# Patient Record
Sex: MALE | Race: WHITE | Employment: OTHER | URBAN - METROPOLITAN AREA
[De-identification: names, ages, dates, MRNs, and addresses within clinical notes are randomized per-mention and may not be internally consistent; named-entity substitution may affect disease eponyms.]

---

## 2017-04-18 DIAGNOSIS — R74.8 LOW SERUM HDL: Primary | ICD-10-CM

## 2017-04-18 PROBLEM — E78.6 LOW HDL (UNDER 40): Status: ACTIVE | Noted: 2017-04-18

## 2017-04-18 NOTE — LETTER
4/20/2017 10:44 AM 
 
Mr. Servando Palomo [address] 
[address] [facility] Dear Servando Palomo: 
 
Please find your most recent results below. Resulted Orders LIPID PANEL Result Value Ref Range Cholesterol, total 179 100 - 199 mg/dL Triglyceride 320 (H) 0 - 149 mg/dL HDL Cholesterol 32 (L) >39 mg/dL VLDL, calculated 64 (H) 5 - 40 mg/dL LDL, calculated 83 0 - 99 mg/dL Narrative Performed at:  [address]  [id number] : Andrea Puga MD, Phone:  [phone] CVD REPORT Result Value Ref Range INTERPRETATION Note Comment:  
   Supplement report is available. Narrative Performed at:  [address] [place] [address]  [id number] : Wendi Bradford PhD, Phone:  [phone] Sincerely, Mary Robles MD

## 2017-04-19 ENCOUNTER — LAB ONLY (OUTPATIENT)
Dept: FAMILY MEDICINE CLINIC | Age: 77
End: 2017-04-19

## 2017-04-19 DIAGNOSIS — Z12.5 PROSTATE CANCER SCREENING: Primary | ICD-10-CM

## 2017-04-19 DIAGNOSIS — E55.9 VITAMIN D DEFICIENCY: ICD-10-CM

## 2017-04-19 DIAGNOSIS — E03.9 HYPOTHYROIDISM, UNSPECIFIED TYPE: ICD-10-CM

## 2017-04-19 NOTE — LETTER
4/20/2017 10:41 AM 
 
Mr. Mariya Martinez [address] 
[address] [facility] Dear Mariya Martinez: 
 
Please find your most recent results below. Resulted Orders TSH 3RD GENERATION Result Value Ref Range TSH 1.930 0.450 - 4.500 uIU/mL Narrative Performed at:  [address]  [id number] : Bran Brooks MD, Phone:  [phone] T4, FREE Result Value Ref Range T4, Free 1.56 0.82 - 1.77 ng/dL Narrative Performed at:  [address]  [id number] : Bran Brooks MD, Phone:  [phone] VITAMIN D, 25 HYDROXY Result Value Ref Range VITAMIN D, 25-HYDROXY 36.5 30.0 - 100.0 ng/mL Comment:  
   Vitamin D deficiency has been defined by the [address] [place] [address] practice guideline as a 
level of serum 25-OH vitamin D less than 20 ng/mL (1,2). The Endocrine Society went on to further define vitamin D 
insufficiency as a level between 21 and 29 ng/mL (2). 1. IOM ([place] [employer]). 2010. Dietary reference 
   intakes for calcium and D. [facility]: The 
   [employer]. 2. Selena MF, Bere NC, Sharmaine CARDENAS, et al. 
   Evaluation, treatment, and prevention of vitamin D 
   deficiency: an Endocrine Society clinical practice 
   guideline. JCEM. 2011 Jul; 96(7):1911-30. Narrative Performed at:  [address]  [id number] : Bran Brooks MD, Phone:  [phone] PSA SCREENING (SCREENING) Result Value Ref Range Prostate Specific Ag 1.8 0.0 - 4.0 ng/mL Comment:  
   Roche ECLIA methodology. According to the American Urological Association, Serum PSA should 
decrease and remain at undetectable levels after radical 
prostatectomy.  The AUA defines biochemical recurrence as an initial 
 PSA value 0.2 ng/mL or greater followed by a subsequent confirmatory PSA value 0.2 ng/mL or greater. Values obtained with different assay methods or kits cannot be used 
interchangeably. Results cannot be interpreted as absolute evidence 
of the presence or absence of malignant disease. Narrative Performed at:  [address]  [id number] : Geoff Strickland MD, Phone:  [phone] Sincerely, [facility]

## 2017-04-20 LAB
25(OH)D3+25(OH)D2 SERPL-MCNC: 36.5 NG/ML (ref 30–100)
CHOLEST SERPL-MCNC: 179 MG/DL (ref 100–199)
HDLC SERPL-MCNC: 32 MG/DL
INTERPRETATION, 910389: NORMAL
LDLC SERPL CALC-MCNC: 83 MG/DL (ref 0–99)
PSA SERPL-MCNC: 1.8 NG/ML (ref 0–4)
T4 FREE SERPL-MCNC: 1.56 NG/DL (ref 0.82–1.77)
TRIGL SERPL-MCNC: 320 MG/DL (ref 0–149)
TSH SERPL DL<=0.005 MIU/L-ACNC: 1.93 UIU/ML (ref 0.45–4.5)
VLDLC SERPL CALC-MCNC: 64 MG/DL (ref 5–40)

## 2017-04-20 RX ORDER — LEVOTHYROXINE SODIUM 112 UG/1
112 TABLET ORAL
Qty: 90 TAB | Refills: 3 | Status: SHIPPED | OUTPATIENT
Start: 2017-04-20

## 2017-04-20 RX ORDER — CLINDAMYCIN PHOSPHATE 11.9 MG/ML
SOLUTION TOPICAL
Refills: 12 | COMMUNITY
Start: 2017-02-28

## 2017-04-20 NOTE — TELEPHONE ENCOUNTER
Level fine on this dose  Patient declines [address] but will schedule appt to address open HM topics-he declines colonoscopy at his age but will discuss the FIT test

## 2017-12-29 PROBLEM — H25.11 AGE-RELATED NUCLEAR CATARACT OF RIGHT EYE: Status: ACTIVE | Noted: 2017-12-26

## 2017-12-29 PROBLEM — H25.042 POSTERIOR SUBCAPSULAR POLAR AGE-RELATED CATARACT OF LEFT EYE: Status: ACTIVE | Noted: 2017-12-26

## 2017-12-29 PROBLEM — H01.006 BLEPHARITIS OF BOTH EYES: Status: ACTIVE | Noted: 2017-12-26

## 2017-12-29 PROBLEM — H01.003 BLEPHARITIS OF BOTH EYES: Status: ACTIVE | Noted: 2017-12-26

## 2018-10-09 RX ORDER — LEVOTHYROXINE SODIUM 112 UG/1
112 TABLET ORAL
Qty: 90 TAB | Refills: 3 | OUTPATIENT
Start: 2018-10-09

## 2018-10-09 NOTE — TELEPHONE ENCOUNTER
PCP: Jackie Hernandez MD    Last appt: Visit date not found  No future appointments. Requested Prescriptions     Pending Prescriptions Disp Refills    levothyroxine (SYNTHROID) 112 mcg tablet 90 Tab 3     Sig: Take 1 Tab by mouth Daily (before breakfast).        Prior labs and Blood pressures:  BP Readings from Last 3 Encounters:   05/04/15 106/77   08/17/14 155/81   06/20/13 137/89     Lab Results   Component Value Date/Time    Sodium 140 06/12/2015 08:03 AM    Potassium 4.3 06/12/2015 08:03 AM    Chloride 97 06/12/2015 08:03 AM    CO2 26 06/12/2015 08:03 AM    Anion gap 6 09/10/2009 10:25 AM    Glucose 88 06/12/2015 08:03 AM    BUN 16 06/12/2015 08:03 AM    Creatinine 1.00 06/12/2015 08:03 AM    BUN/Creatinine ratio 16 06/12/2015 08:03 AM    GFR est AA 85 06/12/2015 08:03 AM    GFR est non-AA 74 06/12/2015 08:03 AM    Calcium 8.6 06/12/2015 08:03 AM     No results found for: HBA1C, HGBE8, NTB7IORJ, XVX0HQBG  Lab Results   Component Value Date/Time    Cholesterol, total 179 04/19/2017 09:32 AM    HDL Cholesterol 32 (L) 04/19/2017 09:32 AM    LDL, calculated 83 04/19/2017 09:32 AM    VLDL, calculated 64 (H) 04/19/2017 09:32 AM    Triglyceride 320 (H) 04/19/2017 09:32 AM     Lab Results   Component Value Date/Time    Vitamin D 25-Hydroxy 27.4 (L) 12/10/2010 11:48 AM    VITAMIN D, 25-HYDROXY 36.5 04/19/2017 09:06 AM       Lab Results   Component Value Date/Time    TSH 1.930 04/19/2017 09:06 AM